# Patient Record
Sex: FEMALE | Race: WHITE | HISPANIC OR LATINO | Employment: FULL TIME | URBAN - METROPOLITAN AREA
[De-identification: names, ages, dates, MRNs, and addresses within clinical notes are randomized per-mention and may not be internally consistent; named-entity substitution may affect disease eponyms.]

---

## 2017-04-26 ENCOUNTER — OFFICE VISIT (OUTPATIENT)
Dept: URGENT CARE | Facility: PHYSICIAN GROUP | Age: 32
End: 2017-04-26
Payer: COMMERCIAL

## 2017-04-26 VITALS
HEART RATE: 80 BPM | TEMPERATURE: 97.7 F | SYSTOLIC BLOOD PRESSURE: 100 MMHG | HEIGHT: 66 IN | RESPIRATION RATE: 12 BRPM | WEIGHT: 204 LBS | BODY MASS INDEX: 32.78 KG/M2 | DIASTOLIC BLOOD PRESSURE: 66 MMHG | OXYGEN SATURATION: 93 %

## 2017-04-26 DIAGNOSIS — J06.9 VIRAL URI: ICD-10-CM

## 2017-04-26 LAB
INT CON NEG: NEGATIVE
INT CON POS: POSITIVE
S PYO AG THROAT QL: NEGATIVE

## 2017-04-26 PROCEDURE — 99214 OFFICE O/P EST MOD 30 MIN: CPT | Performed by: PHYSICIAN ASSISTANT

## 2017-04-26 PROCEDURE — 87880 STREP A ASSAY W/OPTIC: CPT | Performed by: PHYSICIAN ASSISTANT

## 2017-04-26 ASSESSMENT — ENCOUNTER SYMPTOMS
COUGH: 0
NAUSEA: 0
VOMITING: 0
ABDOMINAL PAIN: 0
MUSCULOSKELETAL NEGATIVE: 1
SHORTNESS OF BREATH: 0
DIARRHEA: 0
FEVER: 0
SORE THROAT: 1
CHILLS: 0
DIZZINESS: 0
SWOLLEN GLANDS: 0
TROUBLE SWALLOWING: 1

## 2017-04-26 NOTE — Clinical Note
April 26, 2017         Patient: Erick Crain   YOB: 1985   Date of Visit: 4/26/2017           To Whom it May Concern:    Erick Crain was seen in my clinic on 4/26/2017. Please excuse her absence today.    If you have any questions or concerns, please don't hesitate to call.        Sincerely,           Paulette Watson PA-C  Electronically Signed

## 2017-04-26 NOTE — PROGRESS NOTES
"Subjective:      Erick Crain is a 32 y.o. female who presents with Pharyngitis            Pharyngitis   This is a new problem. The current episode started in the past 7 days (2 days). The problem has been unchanged. Neither side of throat is experiencing more pain than the other. The fever has been present for less than 1 day. The pain is at a severity of 3/10. The pain is mild. Associated symptoms include congestion and trouble swallowing. Pertinent negatives include no abdominal pain, coughing, diarrhea, ear pain, shortness of breath, swollen glands or vomiting. She has had no exposure to strep or mono. She has tried nothing for the symptoms.     She believes she saw white spots on the back of her throat yesterday. Patient reports history of tonsillectomy.    Review of Systems   Constitutional: Negative for fever and chills.   HENT: Positive for congestion, sore throat and trouble swallowing. Negative for ear pain.    Respiratory: Negative for cough and shortness of breath.    Cardiovascular: Negative for chest pain.   Gastrointestinal: Negative for nausea, vomiting, abdominal pain and diarrhea.   Genitourinary: Negative.    Musculoskeletal: Negative.    Neurological: Negative for dizziness.          Objective:     /66 mmHg  Pulse 80  Temp(Src) 36.5 °C (97.7 °F)  Resp 12  Ht 1.676 m (5' 6\")  Wt 92.534 kg (204 lb)  BMI 32.94 kg/m2  SpO2 93%     Physical Exam   Constitutional: She is oriented to person, place, and time. She appears well-developed and well-nourished. No distress.   HENT:   Head: Normocephalic and atraumatic.   Right Ear: Hearing, tympanic membrane, external ear and ear canal normal.   Left Ear: Hearing, tympanic membrane, external ear and ear canal normal.   Nose: Nose normal.   Mouth/Throat: Posterior oropharyngeal erythema present. No oropharyngeal exudate.   Posterior oropharynx mildly erythematous without any exudates noted. Tonsils absent.    Eyes: Conjunctivae are normal. Pupils " are equal, round, and reactive to light. Right eye exhibits no discharge. Left eye exhibits no discharge.   Neck: Normal range of motion.   Cardiovascular: Normal rate, regular rhythm and normal heart sounds.    No murmur heard.  Pulmonary/Chest: Effort normal and breath sounds normal. No respiratory distress. She has no wheezes. She has no rales.   Musculoskeletal: Normal range of motion.   Lymphadenopathy:     She has no cervical adenopathy.   Neurological: She is alert and oriented to person, place, and time.   Skin: Skin is warm and dry. She is not diaphoretic.   Psychiatric: She has a normal mood and affect. Her behavior is normal.   Nursing note and vitals reviewed.         PMH:  has a past medical history of Anesthesia; Depression with anxiety; Anxiety; and Depression.  MEDS:   Current outpatient prescriptions:   •  loratadine (CLARITIN) 10 MG Tab, Take 10 mg by mouth every day., Disp: , Rfl:   •  omeprazole (PRILOSEC) 20 MG delayed-release capsule, Take 20 mg by mouth every day., Disp: , Rfl:   •  Multiple Vitamin (MULTI VITAMIN DAILY PO), Take  by mouth., Disp: , Rfl:   •  levonorgestrel (MIRENA) 20 MCG/24HR IUD, 1 Each by Intrauterine route Once., Disp: , Rfl:   •  oxycodone (ROXICODONE) 5 MG/5ML solution, Take 5-10 mg by mouth every four hours as needed., Disp: , Rfl:   •  ondansetron (ZOFRAN ODT) 8 MG TABLET DISPERSIBLE, Take 8 mg by mouth every 8 hours as needed for Nausea/Vomiting., Disp: , Rfl:   •  docusate sodium (COLACE) 100 MG Cap, Take 100 mg by mouth 2 Times a Day., Disp: , Rfl:   •  fluoxetine (PROZAC) 10 MG CAPS, Take 10 mg by mouth every day., Disp: , Rfl:   ALLERGIES:   Allergies   Allergen Reactions   • Acetaminophen Hives and Itching   • Dayquil [Ra Daytime Cold-Flu] Hives and Itching   • Nyquil Hives and Itching     SURGHX:   Past Surgical History   Procedure Laterality Date   • Tonsillectomy  1997   • Release plantar fascia  2014   • Gastric sleeve laparoscopy N/A 7/12/2016      Procedure: GASTRIC SLEEVE LAPAROSCOPY;  Surgeon: John H Ganser, M.D.;  Location: SURGERY [facility];  Service:      SOCHX:  reports that she has never smoked. She does not have any smokeless tobacco history on file. She reports that she drinks alcohol. She reports that she does not use illicit drugs.  FH: family history is not on file.       Assessment/Plan:     1. Viral URI  - POCT Rapid Strep A: NEGATIVE  - Advised patient her symptoms are most likely viral in etiology, recommend supportive care  - Increased fluids and rest  - Work note give at today's visit    Call or return to office if symptoms persist or worsen. The patient demonstrated a good understanding and agreed with the treatment plan.

## 2017-04-26 NOTE — MR AVS SNAPSHOT
"        Erick Crain   2017 8:00 AM   Office Visit   MRN: [id number]    Department:  [facility]   Dept Phone:  [phone]    Description:  Female : 1985   Provider:  Paulette Watson PA-C           Reason for Visit     Pharyngitis White spots on the back of throat, body aches, headache, fever, fatigue  x3 days       Allergies as of 2017     Allergen Noted Reactions    Acetaminophen 2015   Hives, Itching    Dayquil [Ra Daytime Cold-Flu] 2016   Hives, Itching    Nyquil 2016   Hives, Itching      You were diagnosed with     Sore throat   [[id number]]         Vital Signs     Blood Pressure Pulse Temperature Respirations Height Weight    100/66 mmHg 80 36.5 °C (97.7 °F) 12 1.676 m (5' 6\") 92.534 kg (204 lb)    Body Mass Index Oxygen Saturation Smoking Status             32.94 kg/m2 93% Never Smoker          Basic Information     Date Of Birth Sex Race Ethnicity Preferred Language    1985 Female White  Origin (Georgian,[place]ayan,Swedish,[place], etc) English      Problem List              ICD-10-CM Priority Class Noted - Resolved    Morbid obesity (CMS-HCC) E66.01   2016 - Present      Health Maintenance        Date Due Completion Dates    IMM DTaP/Tdap/Td Vaccine (1 - Tdap) 3/30/2004 ---    PAP SMEAR 3/30/2006 ---            Current Immunizations     Influenza Vaccine Quad Inj (Pf) 2015      Below and/or attached are the medications your provider expects you to take. Review all of your home medications and newly ordered medications with your provider and/or pharmacist. Follow medication instructions as directed by your provider and/or pharmacist. Please keep your medication list with you and share with your provider. Update the information when medications are discontinued, doses are changed, or new medications (including over-the-counter products) are added; and carry medication information at all times in the event of emergency situations     Allergies:  " ACETAMINOPHEN - Hives,Itching     DAYQUIL - Hives,Itching     NYQUIL - Hives,Itching               Medications  Valid as of: April 26, 2017 -  8:34 AM    Generic Name Brand Name Tablet Size Instructions for use    Docusate Sodium (Cap) COLACE 100 MG Take 100 mg by mouth 2 Times a Day.        FLUoxetine HCl (Cap) PROZAC 10 MG Take 10 mg by mouth every day.        Levonorgestrel (IUD) MIRENA 20 MCG/24HR 1 Each by Intrauterine route Once.        Loratadine (Tab) CLARITIN 10 MG Take 10 mg by mouth every day.        Multiple Vitamin   Take  by mouth.        Omeprazole (CAPSULE DELAYED RELEASE) PRILOSEC 20 MG Take 20 mg by mouth every day.        Ondansetron (TABLET DISPERSIBLE) ZOFRAN ODT 8 MG Take 8 mg by mouth every 8 hours as needed for Nausea/Vomiting.        OxyCODONE HCl (Solution) ROXICODONE 5 MG/5ML Take 5-10 mg by mouth every four hours as needed.        .                 Medicines prescribed today were sent to:     [facility] [address] [place], [place] [address] [facility] [address] AT [facility] [place] [address] [place] [address]    Phone: [phone] Fax: [phone]    Open 24 Hours?: No      Medication refill instructions:       If your prescription bottle indicates you have medication refills left, it is not necessary to call your provider’s office. Please contact your pharmacy and they will refill your medication.    If your prescription bottle indicates you do not have any refills left, you may request refills at any time through one of the following ways: The online [employer] system (except Urgent Care), by calling your provider’s office, or by asking your pharmacy to contact your provider’s office with a refill request. Medication refills are processed only during regular business hours and may not be available until the next business day. Your provider may request additional information or to have a follow-up visit with you prior to refilling your medication.   *Please Note: Medication refills  are assigned a new Rx number when refilled electronically. Your pharmacy may indicate that no refills were authorized even though a new prescription for the same medication is available at the pharmacy. Please request the medicine by name with the pharmacy before contacting your provider for a refill.           MedDay Access Code: [email or web address]  Expires: 5/26/2017  8:34 AM    MedDay  A secure, online tool to manage your health information     [email or web address]’s MedDay® is a secure, online tool that connects you to your personalized health information from the privacy of your home -- day or night - making it very easy for you to manage your healthcare. Once the activation process is completed, you can even access your medical information using the MedDay barry, which is available for free in the Apple Barry store or Google Play store.     MedDay provides the following levels of access (as shown below):   My Chart Features   [facility] Primary Care Doctor [facility]  Specialists [facility]  Urgent  Care Non-[facility]  Primary Care  Doctor   Email your healthcare team securely and privately 24/7 X X X    Manage appointments: schedule your next appointment; view details of past/upcoming appointments X      Request prescription refills. X      View recent personal medical records, including lab and immunizations X X X X   View health record, including health history, allergies, medications X X X X   Read reports about your outpatient visits, procedures, consult and ER notes X X X X   See your discharge summary, which is a recap of your hospital and/or ER visit that includes your diagnosis, lab results, and care plan. X X       How to register for MedDay:  1. Go to  [email or web address].  2. Click on the Sign Up Now box, which takes you to the New Member Sign Up page. You will need to provide the following information:  a. Enter your MedDay Access Code exactly as it appears at the top of this page. (You will not need to use  this code after you’ve completed the sign-up process. If you do not sign up before the expiration date, you must request a new code.)   b. Enter your date of birth.   c. Enter your home email address.   d. Click Submit, and follow the next screen’s instructions.  3. Create a Blackbayt ID. This will be your Blackbayt login ID and cannot be changed, so think of one that is secure and easy to remember.  4. Create a Black[employer]t password. You can change your password at any time.  5. Enter your Password Reset Question and Answer. This can be used at a later time if you forget your password.   6. Enter your e-mail address. This allows you to receive e-mail notifications when new information is available in [employer].  7. Click Sign Up. You can now view your health information.    For assistance activating your [employer] account, call [phone]

## 2017-12-19 ENCOUNTER — APPOINTMENT (OUTPATIENT)
Dept: RADIOLOGY | Facility: IMAGING CENTER | Age: 32
End: 2017-12-19
Attending: NURSE PRACTITIONER
Payer: COMMERCIAL

## 2017-12-19 ENCOUNTER — OFFICE VISIT (OUTPATIENT)
Dept: URGENT CARE | Facility: PHYSICIAN GROUP | Age: 32
End: 2017-12-19
Payer: COMMERCIAL

## 2017-12-19 VITALS
BODY MASS INDEX: 32.28 KG/M2 | SYSTOLIC BLOOD PRESSURE: 122 MMHG | WEIGHT: 200 LBS | TEMPERATURE: 98.6 F | OXYGEN SATURATION: 97 % | HEART RATE: 63 BPM | RESPIRATION RATE: 14 BRPM | DIASTOLIC BLOOD PRESSURE: 78 MMHG

## 2017-12-19 DIAGNOSIS — T36.95XA ANTIBIOTIC-INDUCED YEAST INFECTION: ICD-10-CM

## 2017-12-19 DIAGNOSIS — R05.8 PRODUCTIVE COUGH: ICD-10-CM

## 2017-12-19 DIAGNOSIS — R06.2 WHEEZE: ICD-10-CM

## 2017-12-19 DIAGNOSIS — B37.9 ANTIBIOTIC-INDUCED YEAST INFECTION: ICD-10-CM

## 2017-12-19 DIAGNOSIS — J40 BRONCHITIS: ICD-10-CM

## 2017-12-19 PROCEDURE — 71020 DX-CHEST-2 VIEWS: CPT | Mod: TC | Performed by: NURSE PRACTITIONER

## 2017-12-19 PROCEDURE — 94640 AIRWAY INHALATION TREATMENT: CPT | Performed by: NURSE PRACTITIONER

## 2017-12-19 PROCEDURE — 99214 OFFICE O/P EST MOD 30 MIN: CPT | Mod: 25 | Performed by: NURSE PRACTITIONER

## 2017-12-19 RX ORDER — FLUCONAZOLE 150 MG/1
150 TABLET ORAL ONCE
Qty: 1 TAB | Refills: 0 | Status: SHIPPED | OUTPATIENT
Start: 2017-12-19 | End: 2017-12-19

## 2017-12-19 RX ORDER — DOXYCYCLINE HYCLATE 100 MG
100 TABLET ORAL 2 TIMES DAILY
Qty: 14 TAB | Refills: 0 | Status: CANCELLED | OUTPATIENT
Start: 2017-12-19

## 2017-12-19 RX ORDER — IPRATROPIUM BROMIDE AND ALBUTEROL SULFATE 2.5; .5 MG/3ML; MG/3ML
3 SOLUTION RESPIRATORY (INHALATION) ONCE
Status: DISCONTINUED | OUTPATIENT
Start: 2017-12-19 | End: 2017-12-19

## 2017-12-19 RX ORDER — ALBUTEROL SULFATE 2.5 MG/3ML
2.5 SOLUTION RESPIRATORY (INHALATION) ONCE
Status: COMPLETED | OUTPATIENT
Start: 2017-12-19 | End: 2017-12-19

## 2017-12-19 RX ORDER — DOXYCYCLINE HYCLATE 100 MG
100 TABLET ORAL 2 TIMES DAILY
Qty: 14 TAB | Refills: 0 | Status: SHIPPED | OUTPATIENT
Start: 2017-12-19 | End: 2022-01-19

## 2017-12-19 RX ORDER — ALBUTEROL SULFATE 90 UG/1
2 AEROSOL, METERED RESPIRATORY (INHALATION) EVERY 6 HOURS PRN
Qty: 8.5 G | Refills: 0 | Status: SHIPPED | OUTPATIENT
Start: 2017-12-19 | End: 2022-01-19

## 2017-12-19 RX ORDER — PREDNISONE 20 MG/1
20 TABLET ORAL DAILY
Qty: 10 TAB | Refills: 0 | Status: SHIPPED | OUTPATIENT
Start: 2017-12-19 | End: 2017-12-24

## 2017-12-19 RX ADMIN — ALBUTEROL SULFATE 2.5 MG: 2.5 SOLUTION RESPIRATORY (INHALATION) at 09:22

## 2017-12-19 ASSESSMENT — ENCOUNTER SYMPTOMS
EYE DISCHARGE: 0
WHEEZING: 1
CHILLS: 0
HEADACHES: 0
SORE THROAT: 0
COUGH: 1
SPUTUM PRODUCTION: 1
DIZZINESS: 0
BACK PAIN: 0
SHORTNESS OF BREATH: 1
PALPITATIONS: 0
ORTHOPNEA: 0
VOMITING: 0
DIARRHEA: 0
CONSTIPATION: 0
FEVER: 0
MYALGIAS: 0
ABDOMINAL PAIN: 0
NAUSEA: 0
WEAKNESS: 0
EYE REDNESS: 0

## 2017-12-19 NOTE — PROGRESS NOTES
Subjective:      Erick Crain is a 32 y.o. female who presents with Cough (mucas x 3 weeks. Lungs hurt, can not catch her breath x this morning.)            HPI  C/o green phlegm, cough, SOB, chest tigtness, wheeze.Fever at home- 100 degrees. Taken Ibuprofen. Symptoms x 3 weeks. Denies nasal congestion or runny nose.    PMH:  has a past medical history of Anesthesia; Anxiety; Depression; and Depression with anxiety.  MEDS:   Current Outpatient Prescriptions:   •  Multiple Vitamin (MULTI VITAMIN DAILY PO), Take  by mouth., Disp: , Rfl:   •  levonorgestrel (MIRENA) 20 MCG/24HR IUD, 1 Each by Intrauterine route Once., Disp: , Rfl:   •  fluoxetine (PROZAC) 10 MG CAPS, Take 10 mg by mouth every day., Disp: , Rfl:   •  loratadine (CLARITIN) 10 MG Tab, Take 10 mg by mouth every day., Disp: , Rfl:   •  oxycodone (ROXICODONE) 5 MG/5ML solution, Take 5-10 mg by mouth every four hours as needed., Disp: , Rfl:   •  omeprazole (PRILOSEC) 20 MG delayed-release capsule, Take 20 mg by mouth every day., Disp: , Rfl:   •  ondansetron (ZOFRAN ODT) 8 MG TABLET DISPERSIBLE, Take 8 mg by mouth every 8 hours as needed for Nausea/Vomiting., Disp: , Rfl:   •  docusate sodium (COLACE) 100 MG Cap, Take 100 mg by mouth 2 Times a Day., Disp: , Rfl:   ALLERGIES:   Allergies   Allergen Reactions   • Acetaminophen Hives and Itching   • Dayquil [Ra Daytime Cold-Flu] Hives and Itching   • Nyquil Hives and Itching     SURGHX:   Past Surgical History:   Procedure Laterality Date   • GASTRIC SLEEVE LAPAROSCOPY N/A 7/12/2016    Procedure: GASTRIC SLEEVE LAPAROSCOPY;  Surgeon: John H Ganser, M.D.;  Location: SURGERY [facility];  Service:    • RELEASE PLANTAR FASCIA  2014   • TONSILLECTOMY  1997     SOCHX:  reports that she has never smoked. She has never used smokeless tobacco. She reports that she drinks alcohol. She reports that she does not use drugs.  FH: Family history was reviewed, no pertinent findings to report    Review of Systems    Constitutional: Negative for chills, fever and malaise/fatigue.   HENT: Negative for congestion, ear pain and sore throat.    Eyes: Negative for discharge and redness.   Respiratory: Positive for cough, sputum production, shortness of breath and wheezing.    Cardiovascular: Negative for chest pain, palpitations and orthopnea.   Gastrointestinal: Negative for abdominal pain, constipation, diarrhea, nausea and vomiting.   Musculoskeletal: Negative for back pain and myalgias.   Neurological: Negative for dizziness, weakness and headaches.   All other systems reviewed and are negative.         Objective:     /78   Pulse 63   Temp 37 °C (98.6 °F)   Resp 14   Wt 90.7 kg (200 lb)   SpO2 97%   BMI 32.28 kg/m²      Physical Exam   Constitutional: She is oriented to person, place, and time. Vital signs are normal. She appears well-developed and well-nourished. She is active and cooperative.  Non-toxic appearance. She does not have a sickly appearance. She does not appear ill. No distress.   HENT:   Head: Normocephalic.   Right Ear: Hearing, tympanic membrane, external ear and ear canal normal.   Left Ear: Hearing, tympanic membrane, external ear and ear canal normal.   Nose: Nose normal. No mucosal edema, rhinorrhea or sinus tenderness.   Mouth/Throat: Uvula is midline and oropharynx is clear and moist. Mucous membranes are dry. No uvula swelling.   Eyes: Conjunctivae and EOM are normal. Pupils are equal, round, and reactive to light.   Neck: Normal range of motion. Neck supple.   Cardiovascular: Normal rate and regular rhythm.    Pulmonary/Chest: Effort normal and breath sounds normal. No accessory muscle usage. No respiratory distress. She has no decreased breath sounds. She has no wheezes. She has no rhonchi. She has no rales.   Musculoskeletal: Normal range of motion.   Lymphadenopathy:     She has no cervical adenopathy.   Neurological: She is alert and oriented to person, place, and time.   Skin: Skin is  warm and dry. She is not diaphoretic.   Vitals reviewed.  Albuterol breathing treatment: Patient has chest tightness, SOB without wheeze or CP. Cough induced especially with deep breathing. After, patient states able to breathe deep without coughing. Air movement throughout.    CXR:  FINDINGS:  The mediastinal and cardiac silhouette is unremarkable.  The pulmonary vascularity is within normal limits.  The lung parenchyma is clear.  There is no significant pleural effusion.  There is no visible pneumothorax.  There are no acute bony abnormalities.          Assessment/Plan:     1. Productive cough    - DX-CHEST-2 VIEWS; Future  - albuterol (PROVENTIL) 2.5mg/3ml nebulizer solution 2.5 mg; 3 mL by Nebulization route Once.  - predniSONE (DELTASONE) 20 MG Tab; Take 1 Tab by mouth every day for 5 days.  Dispense: 10 Tab; Refill: 0  - albuterol 108 (90 Base) MCG/ACT Aero Soln inhalation aerosol; Inhale 2 Puffs by mouth every 6 hours as needed for Shortness of Breath.  Dispense: 8.5 g; Refill: 0    2. Wheeze    - albuterol (PROVENTIL) 2.5mg/3ml nebulizer solution 2.5 mg; 3 mL by Nebulization route Once.  - predniSONE (DELTASONE) 20 MG Tab; Take 1 Tab by mouth every day for 5 days.  Dispense: 10 Tab; Refill: 0  - albuterol 108 (90 Base) MCG/ACT Aero Soln inhalation aerosol; Inhale 2 Puffs by mouth every 6 hours as needed for Shortness of Breath.  Dispense: 8.5 g; Refill: 0    3. Bronchitis    - predniSONE (DELTASONE) 20 MG Tab; Take 1 Tab by mouth every day for 5 days.  Dispense: 10 Tab; Refill: 0  - albuterol 108 (90 Base) MCG/ACT Aero Soln inhalation aerosol; Inhale 2 Puffs by mouth every 6 hours as needed for Shortness of Breath.  Dispense: 8.5 g; Refill: 0  - doxycycline (VIBRAMYCIN) 100 MG Tab; Take 1 Tab by mouth 2 times a day.  Dispense: 14 Tab; Refill: 0

## 2021-11-01 ENCOUNTER — TELEPHONE (OUTPATIENT)
Dept: OBGYN | Facility: CLINIC | Age: 36
End: 2021-11-01

## 2021-11-01 NOTE — TELEPHONE ENCOUNTER
Phone call from pt with concerns about vagial infection with odor, discharge and burning. Denies change in sexual partner. 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

## 2022-01-19 ENCOUNTER — HOSPITAL ENCOUNTER (OUTPATIENT)
Facility: MEDICAL CENTER | Age: 37
End: 2022-01-19
Attending: FAMILY MEDICINE
Payer: COMMERCIAL

## 2022-01-19 ENCOUNTER — OFFICE VISIT (OUTPATIENT)
Dept: URGENT CARE | Facility: PHYSICIAN GROUP | Age: 37
End: 2022-01-19
Payer: COMMERCIAL

## 2022-01-19 VITALS
HEART RATE: 100 BPM | TEMPERATURE: 99 F | HEIGHT: 64 IN | WEIGHT: 201 LBS | BODY MASS INDEX: 34.31 KG/M2 | OXYGEN SATURATION: 97 % | RESPIRATION RATE: 16 BRPM | DIASTOLIC BLOOD PRESSURE: 68 MMHG | SYSTOLIC BLOOD PRESSURE: 122 MMHG

## 2022-01-19 DIAGNOSIS — Z20.822 EXPOSURE TO COVID-19 VIRUS: ICD-10-CM

## 2022-01-19 DIAGNOSIS — H92.09 OTALGIA, UNSPECIFIED LATERALITY: ICD-10-CM

## 2022-01-19 DIAGNOSIS — Z86.69 HISTORY OF OTITIS MEDIA: ICD-10-CM

## 2022-01-19 DIAGNOSIS — J02.9 PHARYNGITIS, UNSPECIFIED ETIOLOGY: ICD-10-CM

## 2022-01-19 DIAGNOSIS — R05.9 COUGH: ICD-10-CM

## 2022-01-19 DIAGNOSIS — R06.2 WHEEZE: ICD-10-CM

## 2022-01-19 PROCEDURE — U0005 INFEC AGEN DETEC AMPLI PROBE: HCPCS

## 2022-01-19 PROCEDURE — 99214 OFFICE O/P EST MOD 30 MIN: CPT | Mod: CS | Performed by: FAMILY MEDICINE

## 2022-01-19 PROCEDURE — U0003 INFECTIOUS AGENT DETECTION BY NUCLEIC ACID (DNA OR RNA); SEVERE ACUTE RESPIRATORY SYNDROME CORONAVIRUS 2 (SARS-COV-2) (CORONAVIRUS DISEASE [COVID-19]), AMPLIFIED PROBE TECHNIQUE, MAKING USE OF HIGH THROUGHPUT TECHNOLOGIES AS DESCRIBED BY CMS-2020-01-R: HCPCS

## 2022-01-19 RX ORDER — BENZONATATE 200 MG/1
200 CAPSULE ORAL 3 TIMES DAILY PRN
Qty: 30 CAPSULE | Refills: 0 | Status: SHIPPED | OUTPATIENT
Start: 2022-01-19 | End: 2022-02-07

## 2022-01-19 RX ORDER — ALBUTEROL SULFATE 90 UG/1
2 AEROSOL, METERED RESPIRATORY (INHALATION) EVERY 4 HOURS PRN
Qty: 1 EACH | Refills: 0 | Status: SHIPPED | OUTPATIENT
Start: 2022-01-19

## 2022-01-19 RX ORDER — LIDOCAINE HYDROCHLORIDE 20 MG/ML
15 SOLUTION OROPHARYNGEAL EVERY 4 HOURS PRN
Qty: 120 ML | Refills: 0 | Status: SHIPPED | OUTPATIENT
Start: 2022-01-19

## 2022-01-19 ASSESSMENT — ENCOUNTER SYMPTOMS
EYE REDNESS: 0
WEIGHT LOSS: 0
HEADACHES: 1
EYE DISCHARGE: 0

## 2022-01-19 NOTE — PROGRESS NOTES
"Subjective     Erick Crain is a 36 y.o. female who presents with Body Aches (sore throat,fatigue,cought,x2 days)            Onset 1/17/2022 myalgia, dry cough, sore throat, and fatigue.  COVID 19 exposure. Covid vaccinated. Mild SOB. Wheeze last night. No PMH asthma/pneumonia. . +loss of taste. No loss of smell.  No vomiting or diarrhea. Home rapid test is +/work requires PCR. No other aggravating or alleviating factors.      Review of Systems   Constitutional: Negative for weight loss.   Eyes: Negative for discharge and redness.   Musculoskeletal: Negative for joint pain.   Skin: Negative for itching and rash.   Neurological: Positive for headaches.              Objective     /68 (BP Location: Right arm, Patient Position: Sitting, BP Cuff Size: Adult)   Pulse 100   Temp 37.2 °C (99 °F) (Temporal)   Resp 16   Ht 1.626 m (5' 4\")   Wt 91.2 kg (201 lb)   SpO2 97%   BMI 34.50 kg/m²      Physical Exam  Constitutional:       General: She is not in acute distress.     Appearance: She is well-developed.   HENT:      Head: Normocephalic and atraumatic.      Nose: Congestion present.      Mouth/Throat:      Mouth: Mucous membranes are moist.      Pharynx: Posterior oropharyngeal erythema present.   Eyes:      Conjunctiva/sclera: Conjunctivae normal.   Cardiovascular:      Rate and Rhythm: Normal rate and regular rhythm.      Heart sounds: Normal heart sounds. No murmur heard.      Pulmonary:      Effort: Pulmonary effort is normal.      Breath sounds: Normal breath sounds. No wheezing.   Skin:     General: Skin is warm and dry.      Findings: No rash.   Neurological:      Mental Status: She is alert and oriented to person, place, and time.                             Assessment & Plan         1. Cough  COVID/SARS CoV-2 PCR    benzonatate (TESSALON) 200 MG capsule   2. Pharyngitis, unspecified etiology  COVID/SARS CoV-2 PCR    lidocaine (XYLOCAINE) 2 % Solution   3. Exposure to COVID-19 virus  COVID/SARS CoV-2 " PCR   4. Wheeze  COVID/SARS CoV-2 PCR    albuterol 108 (90 Base) MCG/ACT Aero Soln inhalation aerosol     Differential diagnosis, natural history, supportive care, and indications for immediate follow-up discussed at length.

## 2022-01-20 DIAGNOSIS — R05.9 COUGH: ICD-10-CM

## 2022-01-20 DIAGNOSIS — R06.2 WHEEZE: ICD-10-CM

## 2022-01-20 DIAGNOSIS — J02.9 PHARYNGITIS, UNSPECIFIED ETIOLOGY: ICD-10-CM

## 2022-01-20 DIAGNOSIS — Z20.822 EXPOSURE TO COVID-19 VIRUS: ICD-10-CM

## 2022-01-20 LAB — COVID ORDER STATUS COVID19: NORMAL

## 2022-01-21 LAB
SARS-COV-2 RNA RESP QL NAA+PROBE: DETECTED
SPECIMEN SOURCE: ABNORMAL

## 2022-01-22 PROBLEM — N64.4 BREAST PAIN: Status: ACTIVE | Noted: 2021-02-26

## 2022-01-22 PROBLEM — A60.00 HERPES, GENITAL: Status: ACTIVE | Noted: 2021-02-26

## 2022-01-22 RX ORDER — AMOXICILLIN 875 MG/1
875 TABLET, COATED ORAL 2 TIMES DAILY
Qty: 14 TABLET | Refills: 0 | Status: SHIPPED | OUTPATIENT
Start: 2022-01-22 | End: 2022-01-29

## 2022-02-07 ENCOUNTER — OFFICE VISIT (OUTPATIENT)
Dept: URGENT CARE | Facility: PHYSICIAN GROUP | Age: 37
End: 2022-02-07
Payer: COMMERCIAL

## 2022-02-07 VITALS
OXYGEN SATURATION: 100 % | BODY MASS INDEX: 33.97 KG/M2 | HEART RATE: 87 BPM | HEIGHT: 64 IN | RESPIRATION RATE: 16 BRPM | WEIGHT: 199 LBS | SYSTOLIC BLOOD PRESSURE: 102 MMHG | TEMPERATURE: 98.2 F | DIASTOLIC BLOOD PRESSURE: 70 MMHG

## 2022-02-07 DIAGNOSIS — U07.1 COVID-19 VIRUS INFECTION: ICD-10-CM

## 2022-02-07 PROCEDURE — 99213 OFFICE O/P EST LOW 20 MIN: CPT | Mod: CS | Performed by: FAMILY MEDICINE

## 2022-02-07 RX ORDER — PREDNISONE 20 MG/1
40 TABLET ORAL DAILY
Qty: 10 TABLET | Refills: 0 | Status: SHIPPED | OUTPATIENT
Start: 2022-02-07 | End: 2022-02-12

## 2022-02-07 RX ORDER — FLUOXETINE 20 MG/1
TABLET, FILM COATED ORAL
COMMUNITY
Start: 2022-01-25

## 2022-02-07 ASSESSMENT — ENCOUNTER SYMPTOMS
COUGH: 1
SHORTNESS OF BREATH: 1
FEVER: 0

## 2022-02-07 NOTE — PROGRESS NOTES
Subjective:     Erick Crain is a 36 y.o. female who presents for Cough (SOB positive for covid symptoms not improving d7ispax)    HPI  Pt presents for evaluation of an acute problem  Patient with an illness for the last 3 weeks  Diagnosed with Covid on 1/19/2022  Most symptoms have resolved   Still having a moderate cough   Having SOB   Using albuterol inhaler and not helping     Review of Systems   Constitutional: Negative for fever.   Respiratory: Positive for cough and shortness of breath.    Skin: Negative for rash.       PMH:  has a past medical history of Anesthesia, Anxiety, Depression, and Depression with anxiety.  MEDS:   Current Outpatient Medications:   •  fluoxetine (PROZAC) 20 MG tablet, , Disp: , Rfl:   •  ALPRAZolam (XANAX PO), Take  by mouth., Disp: , Rfl:   •  albuterol 108 (90 Base) MCG/ACT Aero Soln inhalation aerosol, Inhale 2 Puffs every four hours as needed for Shortness of Breath., Disp: 1 Each, Rfl: 0  •  fluoxetine (PROZAC) 10 MG CAPS, Take 10 mg by mouth every day., Disp: , Rfl:   •  benzonatate (TESSALON) 200 MG capsule, Take 1 Capsule by mouth 3 times a day as needed for Cough., Disp: 30 Capsule, Rfl: 0  •  lidocaine (XYLOCAINE) 2 % Solution, Take 15 mL by mouth every four hours as needed for Throat/Mouth Pain. Gargle and spit every 4 hours as needed, Disp: 120 mL, Rfl: 0  ALLERGIES:   Allergies   Allergen Reactions   • Acetaminophen Hives and Itching   • Dayquil [Ra Daytime Cold-Flu] Hives and Itching   • Nyquil Hives and Itching     SURGHX:   Past Surgical History:   Procedure Laterality Date   • GASTRIC SLEEVE LAPAROSCOPY N/A 7/12/2016    Procedure: GASTRIC SLEEVE LAPAROSCOPY;  Surgeon: John H Ganser, M.D.;  Location: SURGERY [facility];  Service:    • RELEASE PLANTAR FASCIA  2014   • TONSILLECTOMY  1997     SOCHX:  reports that she has never smoked. She has never used smokeless tobacco. She reports current alcohol use. She reports that she does not use drugs.     Objective:  "  /70   Pulse 87   Temp 36.8 °C (98.2 °F)   Resp 16   Ht 1.626 m (5' 4\")   Wt 90.3 kg (199 lb)   SpO2 100%   BMI 34.16 kg/m²     Physical Exam  Constitutional:       General: She is not in acute distress.     Appearance: She is well-developed. She is not diaphoretic.   HENT:      Head: Normocephalic and atraumatic.      Mouth/Throat:      Mouth: Mucous membranes are moist.      Pharynx: Oropharynx is clear. No oropharyngeal exudate or posterior oropharyngeal erythema.   Neck:      Trachea: No tracheal deviation.   Cardiovascular:      Rate and Rhythm: Normal rate and regular rhythm.   Pulmonary:      Effort: Pulmonary effort is normal. No respiratory distress.      Breath sounds: Normal breath sounds. No wheezing or rales.   Musculoskeletal:      Cervical back: Normal range of motion and neck supple. No tenderness.   Lymphadenopathy:      Cervical: No cervical adenopathy.   Skin:     General: Skin is warm and dry.      Findings: No rash.   Neurological:      Mental Status: She is alert.       Assessment/Plan:   Assessment    1. COVID-19 virus infection  - predniSONE (DELTASONE) 20 MG Tab; Take 2 Tablets by mouth every day for 5 days.  Dispense: 10 Tablet; Refill: 0    Other orders  - fluoxetine (PROZAC) 20 MG tablet    Patient with COVID-19 infection.  She is having a slower recovery.  Physical exam in office normal.  Albuterol inhaler not helping and continues to have moderate cough during the day and shortness of breath.  Advised that there may not be many things which can speed her recovery other than the rest.  Advised that steroids could be of some benefit, however benefit may only be small.  Patient wishing to try steroids and note for work given.  Follow-up as needed.  "

## 2022-02-07 NOTE — LETTER
February 7, 2022    To Whom It May Concern:         This is confirmation that Erick Crain attended her scheduled appointment with Herbie Fraire M.D. on 2/07/22.  She is making a slow recovery from COVID-19.  She is recommended to take the next 7 days off of work in order to rest and recover.  Thank you for making accommodations as she recovers.           If you have any questions please do not hesitate to call me at the phone number listed below.    Sincerely,          Herbie Fraire M.D.  [phone]

## 2023-08-30 ENCOUNTER — OFFICE VISIT (OUTPATIENT)
Dept: URGENT CARE | Facility: PHYSICIAN GROUP | Age: 38
End: 2023-08-30
Payer: COMMERCIAL

## 2023-08-30 VITALS
OXYGEN SATURATION: 98 % | SYSTOLIC BLOOD PRESSURE: 118 MMHG | WEIGHT: 190 LBS | BODY MASS INDEX: 31.65 KG/M2 | HEIGHT: 65 IN | TEMPERATURE: 98.1 F | DIASTOLIC BLOOD PRESSURE: 64 MMHG | HEART RATE: 107 BPM | RESPIRATION RATE: 18 BRPM

## 2023-08-30 DIAGNOSIS — Z01.89 PATIENT REQUEST FOR DIAGNOSTIC TESTING: ICD-10-CM

## 2023-08-30 DIAGNOSIS — U07.1 COVID-19: ICD-10-CM

## 2023-08-30 DIAGNOSIS — R05.1 ACUTE COUGH: ICD-10-CM

## 2023-08-30 LAB
FLUAV RNA SPEC QL NAA+PROBE: NEGATIVE
FLUBV RNA SPEC QL NAA+PROBE: NEGATIVE
RSV RNA SPEC QL NAA+PROBE: NEGATIVE
SARS-COV-2 RNA RESP QL NAA+PROBE: POSITIVE

## 2023-08-30 PROCEDURE — 3078F DIAST BP <80 MM HG: CPT | Performed by: STUDENT IN AN ORGANIZED HEALTH CARE EDUCATION/TRAINING PROGRAM

## 2023-08-30 PROCEDURE — 0241U POCT CEPHEID COV-2, FLU A/B, RSV - PCR: CPT | Performed by: STUDENT IN AN ORGANIZED HEALTH CARE EDUCATION/TRAINING PROGRAM

## 2023-08-30 PROCEDURE — 99213 OFFICE O/P EST LOW 20 MIN: CPT | Performed by: STUDENT IN AN ORGANIZED HEALTH CARE EDUCATION/TRAINING PROGRAM

## 2023-08-30 PROCEDURE — 3074F SYST BP LT 130 MM HG: CPT | Performed by: STUDENT IN AN ORGANIZED HEALTH CARE EDUCATION/TRAINING PROGRAM

## 2023-08-30 ASSESSMENT — ENCOUNTER SYMPTOMS
DIARRHEA: 0
DIZZINESS: 0
HEADACHES: 0
FEVER: 0
CHILLS: 0
SHORTNESS OF BREATH: 0
ABDOMINAL PAIN: 0
SORE THROAT: 0
VOMITING: 0
CONSTIPATION: 0
NAUSEA: 0
WHEEZING: 0
COUGH: 1
PALPITATIONS: 0

## 2023-08-30 NOTE — PROGRESS NOTES
"Subjective     Erick Crain is a 38 y.o. female who presents with Other (Need a negative covid test to return to work )            Erick is a 38 y.o. female who presents to urgent care requesting COVID testing.  Patient states she developed COVID symptoms on 8/21/2023.  Patient tested positive for COVID on 8/22/2023.  Symptoms have improved/resolved.  She reports slight cough.  No shortness of breath/wheezing.  No fever/chills.  Patient requesting COVID testing in clinic because she needs a negative COVID test to return to work.        Review of Systems   Constitutional:  Negative for chills, fever and malaise/fatigue.   HENT:  Negative for congestion, ear pain and sore throat.    Respiratory:  Positive for cough. Negative for shortness of breath and wheezing.    Cardiovascular:  Negative for chest pain and palpitations.   Gastrointestinal:  Negative for abdominal pain, constipation, diarrhea, nausea and vomiting.   Neurological:  Negative for dizziness and headaches.   All other systems reviewed and are negative.             Objective     /64   Pulse (!) 107   Temp 36.7 °C (98.1 °F) (Temporal)   Resp 18   Ht 1.651 m (5' 5\")   Wt 86.2 kg (190 lb)   SpO2 98%   BMI 31.62 kg/m²      Physical Exam  Vitals reviewed.   Constitutional:       Appearance: Normal appearance.   HENT:      Head: Normocephalic and atraumatic.      Nose: Nose normal.      Mouth/Throat:      Mouth: Mucous membranes are moist.      Pharynx: Oropharynx is clear.   Eyes:      Extraocular Movements: Extraocular movements intact.      Conjunctiva/sclera: Conjunctivae normal.      Pupils: Pupils are equal, round, and reactive to light.   Cardiovascular:      Rate and Rhythm: Normal rate and regular rhythm.   Pulmonary:      Effort: Pulmonary effort is normal.      Breath sounds: Normal breath sounds.   Skin:     General: Skin is warm and dry.   Neurological:      General: No focal deficit present.      Mental Status: She is alert. Mental " status is at baseline.                             Assessment & Plan        1. COVID-19    2. Acute cough    3. Patient request for diagnostic testing  - POCT CoV-2, Flu A/B, RSV by PCR     Presents to urgent care requesting POCT COVID testing.  Patient needing negative COVID test to return to work.  Patient developed COVID symptoms on 8/21/2023.  Patient tested positive for COVID on 8/22/2023.  Patient states symptoms have improved/resolved.  Patient is still experiencing lingering of cough.  No shortness of breath/wheezing. Patient reports two at-home negative tests and needs negative test from clinic to return to work.    POCT CoV-2, Flu A/B, RSV by PCR was POSITIVE for COVID-19. Patient notified of results over the phone.    Supportive care measures (rest, hydration, OTC Tylenol/ibuprofen, OTC cough suppressant, throat lozenges, humidified air) and indications for immediate follow-up discussed with patient. Pathogenesis of diagnosis discussed including typical length and natural progression.      Instructed to return to urgent care or nearest emergency department if symptoms fail to improve, for any change in condition, further concerns, or new concerning symptoms.    Patient states understanding and agrees with the plan of care and discharge instructions.

## 2025-02-12 ENCOUNTER — APPOINTMENT (OUTPATIENT)
Dept: OBGYN | Facility: CLINIC | Age: 40
End: 2025-02-12
Payer: COMMERCIAL